# Patient Record
Sex: FEMALE | Race: WHITE | NOT HISPANIC OR LATINO | Employment: FULL TIME | URBAN - METROPOLITAN AREA
[De-identification: names, ages, dates, MRNs, and addresses within clinical notes are randomized per-mention and may not be internally consistent; named-entity substitution may affect disease eponyms.]

---

## 2019-03-22 ENCOUNTER — HOSPITAL ENCOUNTER (EMERGENCY)
Facility: HOSPITAL | Age: 44
Discharge: HOME | End: 2019-03-22
Attending: EMERGENCY MEDICINE | Admitting: EMERGENCY MEDICINE
Payer: COMMERCIAL

## 2019-03-22 ENCOUNTER — APPOINTMENT (EMERGENCY)
Dept: RADIOLOGY | Facility: HOSPITAL | Age: 44
End: 2019-03-22
Payer: COMMERCIAL

## 2019-03-22 VITALS
TEMPERATURE: 98 F | SYSTOLIC BLOOD PRESSURE: 124 MMHG | RESPIRATION RATE: 18 BRPM | HEART RATE: 70 BPM | OXYGEN SATURATION: 98 % | DIASTOLIC BLOOD PRESSURE: 77 MMHG

## 2019-03-22 DIAGNOSIS — S92.502A CLOSED FRACTURE OF PHALANX OF LEFT SECOND TOE, INITIAL ENCOUNTER: Primary | ICD-10-CM

## 2019-03-22 PROCEDURE — 99283 EMERGENCY DEPT VISIT LOW MDM: CPT

## 2019-03-22 PROCEDURE — 73630 X-RAY EXAM OF FOOT: CPT

## 2019-03-22 NOTE — ED PROVIDER NOTES
History  Chief Complaint   Patient presents with   [address] [facility] Injury     Patient reports falling on stairs at home yesterday  Patient states she took Motrin approximately 11h PTA     41 yo female who was coming down steps yesterday and tried to avoid dog pee and got caught on her pants leg and injured L foot  Has had pain and swelling, been hobbling on it all shift here in ER, when we looked at it, it was more swollen and ecchymotic  History provided by:  Patient   used: No    Foot Injury - Major   Location:  Foot and toe  Time since incident:  1 day  Injury: yes    Mechanism of injury: fall    Fall:     Fall occurred:  Down stairs    Height of fall:  1 step  Foot location:  L foot  Toe location:  L second toe and L third toe  Pain details:     Quality:  Aching    Radiates to:  Does not radiate    Severity:  Moderate    Onset quality:  Sudden    Duration:  1 day    Timing:  Constant  Chronicity:  New  Prior injury to area:  No  Relieved by:  Nothing  Worsened by:  Bearing weight  Ineffective treatments:  None tried  Associated symptoms: swelling    Associated symptoms: no back pain, no fatigue, no fever and no neck pain        None       Past Medical History:   Diagnosis Date    Cerebral palsy ([facility] [address] )        Past Surgical History:   Procedure Laterality Date    GASTRIC BYPASS LAPAROSCOPIC         History reviewed  No pertinent family history  I have reviewed and agree with the history as documented  Social History     Tobacco Use    Smoking status: Never Smoker    Smokeless tobacco: Never Used   Substance Use Topics    Alcohol use: Yes     Comment: "occasionally"    Drug use: Never        Review of Systems   Constitutional: Negative for appetite change, chills, fatigue and fever  HENT: Negative for sore throat  Eyes: Negative for visual disturbance  Respiratory: Negative for shortness of breath  Cardiovascular: Negative for chest pain     Gastrointestinal: Negative for abdominal pain, diarrhea, nausea and vomiting  Genitourinary: Negative for dysuria, frequency, vaginal bleeding and vaginal discharge  Musculoskeletal: Negative for back pain, neck pain and neck stiffness  Pain, swelling, ecchymosis L distal dorsal foot    Skin: Negative for pallor and rash  Allergic/Immunologic: Negative for immunocompromised state  Neurological: Negative for light-headedness and headaches  Psychiatric/Behavioral: Negative for confusion  All other systems reviewed and are negative  Physical Exam  Physical Exam   Constitutional: She is oriented to person, place, and time  She appears well-developed  No distress  HENT:   Head: Normocephalic and atraumatic  Mouth/Throat: Oropharynx is clear and moist    Eyes: Pupils are equal, round, and reactive to light  EOM are normal    Neck: Normal range of motion  Neck supple  Cardiovascular: Normal rate  Pulmonary/Chest: Effort normal    Musculoskeletal: Normal range of motion  Neurological: She is alert and oriented to person, place, and time  Skin: Skin is warm  Capillary refill takes less than 2 seconds  No rash noted  No pallor  Ecchymosis dorsal aspect L foot - mainly over L 2, 3rd toes   Psychiatric: She has a normal mood and affect  Her behavior is normal    Screaming incoherently and rhythmically   Nursing note and vitals reviewed        Vital Signs  ED Triage Vitals [03/22/19 0238]   Temperature Pulse Respirations Blood Pressure SpO2   98 °F (36 7 °C) 70 18 124/77 98 %      Temp Source Heart Rate Source Patient Position - Orthostatic VS BP Location FiO2 (%)   Tympanic Monitor Lying Left arm --      Pain Score       --           Vitals:    03/22/19 0238   BP: 124/77   Pulse: 70   Patient Position - Orthostatic VS: Lying         Visual Acuity      ED Medications  Medications - No data to display    Diagnostic Studies  Results Reviewed     None                 XR foot 3+ views LEFT    (Results Pending) Procedures  Procedures       Phone Contacts  ED Phone Contact    ED Course  ED Course as of Mar 22 0700   Fri Mar 22, 2019   0219 Pt seen and examined  41 yo female who was coming down steps yesterday and tried to avoid dog pee and got caught on her pants leg and injured L foot  Has had pain and swelling, been hobbling on it all shift here in ER, when we looked at it, it was more swollen and ecchymotic  NV intact distally  Pain along distal metatarsals  Took motrin  Will xray L foot, no ankle involvement  0236 Xray shows small avulsion fx of medial aspect of proximal aspect of prox phalanx 2nd L toe (where most of ecchymosis is)  Will buddy tape, continue RICE and f/u with ortho as needed  MDM    Disposition  Final diagnoses:   Closed fracture of phalanx of left second toe, initial encounter     Time reflects when diagnosis was documented in both MDM as applicable and the Disposition within this note     Time User Action Codes Description Comment    3/22/2019  2:37 AM Cade Rodriguez Add [[id number]] Closed fracture of phalanx of left second toe, initial encounter       ED Disposition     ED Disposition Condition Date/Time Comment    Discharge Stable Fri Mar 22, 2019  2:37 AM Bernardo Swenson discharge to home/self care  Follow-up Information     Follow up With Specialties Details Why [address], MD Orthopedic Surgery  As needed [address]  [phone]            There are no discharge medications for this patient  No discharge procedures on file      ED Provider  Electronically Signed by           Jaden Wiseman DO  03/22/19 0700